# Patient Record
Sex: FEMALE | Race: WHITE | NOT HISPANIC OR LATINO | Employment: FULL TIME | URBAN - METROPOLITAN AREA
[De-identification: names, ages, dates, MRNs, and addresses within clinical notes are randomized per-mention and may not be internally consistent; named-entity substitution may affect disease eponyms.]

---

## 2023-09-15 ENCOUNTER — TRANSCRIBE ORDERS (OUTPATIENT)
Dept: WOMENS IMAGING | Facility: HOSPITAL | Age: 38
End: 2023-09-15
Payer: COMMERCIAL

## 2023-09-15 DIAGNOSIS — Z98.891 HISTORY OF CESAREAN SECTION: ICD-10-CM

## 2023-09-15 DIAGNOSIS — O43.90 ABNORMAL PLACENTA AFFECTING DELIVERY: Primary | ICD-10-CM

## 2023-10-02 ENCOUNTER — OFFICE VISIT (OUTPATIENT)
Dept: OBSTETRICS AND GYNECOLOGY | Facility: HOSPITAL | Age: 38
End: 2023-10-02
Payer: COMMERCIAL

## 2023-10-02 ENCOUNTER — HOSPITAL ENCOUNTER (OUTPATIENT)
Dept: WOMENS IMAGING | Facility: HOSPITAL | Age: 38
Discharge: HOME | End: 2023-10-02
Admitting: OBSTETRICS & GYNECOLOGY
Payer: COMMERCIAL

## 2023-10-02 VITALS
DIASTOLIC BLOOD PRESSURE: 73 MMHG | BODY MASS INDEX: 27.89 KG/M2 | SYSTOLIC BLOOD PRESSURE: 121 MMHG | WEIGHT: 184 LBS | HEIGHT: 68 IN

## 2023-10-02 DIAGNOSIS — Z98.891 HISTORY OF CESAREAN SECTION: ICD-10-CM

## 2023-10-02 DIAGNOSIS — O09.219 PREVIOUS PRETERM DELIVERY, ANTEPARTUM: ICD-10-CM

## 2023-10-02 DIAGNOSIS — Z98.891 PREVIOUS CESAREAN SECTION: ICD-10-CM

## 2023-10-02 DIAGNOSIS — O44.02 PLACENTA PREVIA IN SECOND TRIMESTER: Primary | ICD-10-CM

## 2023-10-02 DIAGNOSIS — O09.522 MULTIGRAVIDA OF ADVANCED MATERNAL AGE IN SECOND TRIMESTER: ICD-10-CM

## 2023-10-02 DIAGNOSIS — O43.90 ABNORMAL PLACENTA AFFECTING DELIVERY: ICD-10-CM

## 2023-10-02 PROCEDURE — 76811 OB US DETAILED SNGL FETUS: CPT

## 2023-10-02 RX ORDER — NICOTINE 21 MG/24HR
1 PATCH, TRANSDERMAL 24 HOURS TRANSDERMAL EVERY 24 HOURS
COMMUNITY

## 2023-10-02 RX ORDER — ASPIRIN 81 MG/1
81 TABLET ORAL DAILY
COMMUNITY

## 2023-10-02 NOTE — ASSESSMENT & PLAN NOTE
History of 2 prior  sections.  Plan for repeat  section.  If placenta previa persists would recommend delivery between 36 and 37 weeks.

## 2023-10-02 NOTE — PROGRESS NOTES
No complaints today , f/u with Gumaro on 10/4/23, nipt- neg per pt (107 pages of pnr, unable to find results)

## 2023-10-02 NOTE — PROGRESS NOTES
"    Maternal/Fetal Medicine Consult Note   Date: 10/02/2023  Name: Kajal Obrien    : 1985     MRN: [id number]     Referring Provider: Cesar Naik MD    Chief Complaint  Placenta previa, previous  section x2, advanced maternal age, history of  birth    Subjective     History of Present Illness:  Kajal Obrien is a 38 y.o.  24w4d who presents today for consultation secondary to placenta previa, previous  section x2, advanced maternal age, history of  birth    DINO: Estimated Date of Delivery: 24     ROS:   Otherwise Noted in HPI    Past Medical History:   Diagnosis Date    History of  delivery      labor and delivery - substance abuse - child with CP- had cord prolapse    History of substance abuse     Clean since - no meds for recovery at this time    Placenta previa antepartum     this pregnancy      Past Surgical History:   Procedure Laterality Date     SECTION       and 2015 x 2    LEEP  2008      OB History          6    Para   4    Term   3       1    AB   1    Living   4         SAB   1    IAB   0    Ectopic   0    Molar   0    Multiple   0    Live Births   4          Obstetric Comments   Fob #1 - Pregnancy #1  Fob #2 - Pregnancy #2- #6 , Wil Ledbetter  Pregnancy #4 - PTL/PTD- 28 week delivery, cord prolapse               Current Outpatient Medications:     aspirin 81 MG EC tablet, Take 1 tablet by mouth Daily., Disp: , Rfl:     nicotine (NICODERM CQ) 14 MG/24HR patch, Place 1 patch on the skin as directed by provider Daily., Disp: , Rfl:     Objective     Vital Signs  /73   Ht 172.7 cm (68\")   Wt 83.5 kg (184 lb)   Estimated body mass index is 27.98 kg/m² as calculated from the following:    Height as of this encounter: 172.7 cm (68\").    Weight as of this encounter: 83.5 kg (184 lb).    Ultrasound Impression:   See Viewpoint     Assessment and Plan     Kajal Obrien is a 38 y.o.  24w4d " who presents today for consultation secondary to placenta previa, previous  section x2, advanced maternal age, history of  birth    Diagnoses and all orders for this visit:    1. Placenta previa in second trimester (Primary)  Assessment & Plan:  Patient with a complete posterior previa noted today on ultrasound.  Patient does have a history of 2 prior  sections though placenta does not appear to be approaching anterior lower uterine segment.  Discussed continued pelvic rest until follow-up ultrasound      2. Previous  section  Assessment & Plan:  History of 2 prior  sections.  Plan for repeat  section.  If placenta previa persists would recommend delivery between 36 and 37 weeks.      3. Multigravida of advanced maternal age in second trimester  Assessment & Plan:  She reports negative NIPT.      4. Previous  delivery, antepartum  Assessment & Plan:  Reports a history of a 28-week  labor and cord prolapse.  Next delivery was at 39 weeks.           Follow Up  Follow-up ultrasound in 4 weeks    I spent 30 minutes caring for the patient on the day of service. This included: obtaining or reviewing a separately obtained medical history, reviewing patient records, performing a medically appropriate exam and/or evaluation, counseling or educating the patient/family/caregiver, ordering medications, labs, and/or procedures and documenting such in the medical record. This does not include time spent on review and interpretation of other tests such as fetal ultrasound or the performance of other procedures such as amniocentesis or CVS.      Horacio Wynn MD, FACOG  Maternal Fetal Medicine, [facility] [place]

## 2023-10-02 NOTE — ASSESSMENT & PLAN NOTE
Patient with a complete posterior previa noted today on ultrasound.  Patient does have a history of 2 prior  sections though placenta does not appear to be approaching anterior lower uterine segment.  Discussed continued pelvic rest until follow-up ultrasound

## 2023-10-02 NOTE — LETTER
2023       No Recipients    Patient: Kajal Obrien   YOB: 1985   Date of Visit: 10/2/2023       Dear Cesar Naik MD,    Thank you for referring Kajal Obrien to me for evaluation. Below is a copy of my consult note.    If you have questions, please do not hesitate to call me. I look forward to following Kajal along with you.         Sincerely,        Horacio Wynn MD        CC:   No Recipients    No complaints today , f/u with Gumaro on 10/4/23, nipt- neg per pt (107 pages of pnr, unable to find results)         Maternal/Fetal Medicine Consult Note   Date: 10/02/2023  Name: Kajal Obrien    : 1985     MRN: [id number]     Referring Provider: Cesar Naik MD    Chief Complaint  Placenta previa, previous  section x2, advanced maternal age, history of  birth    Subjective     History of Present Illness:  Kajal Obrien is a 38 y.o.  24w4d who presents today for consultation secondary to placenta previa, previous  section x2, advanced maternal age, history of  birth    DINO: Estimated Date of Delivery: 24     ROS:   Otherwise Noted in HPI    Past Medical History:   Diagnosis Date   • History of  delivery      labor and delivery - substance abuse - child with CP- had cord prolapse   • History of substance abuse     Clean since - no meds for recovery at this time   • Placenta previa antepartum     this pregnancy      Past Surgical History:   Procedure Laterality Date   •  SECTION       and 2015 x 2   • LEEP  2008      OB History          6    Para   4    Term   3       1    AB   1    Living   4         SAB   1    IAB   0    Ectopic   0    Molar   0    Multiple   0    Live Births   4          Obstetric Comments   Fob #1 - Pregnancy #1  Fob #2 - Pregnancy #2- #6 , Wil Ledbetter  Pregnancy #4 - PTL/PTD- 28 week delivery, cord prolapse               Current Outpatient Medications:   •   "aspirin 81 MG EC tablet, Take 1 tablet by mouth Daily., Disp: , Rfl:   •  nicotine (NICODERM CQ) 14 MG/24HR patch, Place 1 patch on the skin as directed by provider Daily., Disp: , Rfl:     Objective     Vital Signs  /73   Ht 172.7 cm (68\")   Wt 83.5 kg (184 lb)   Estimated body mass index is 27.98 kg/m² as calculated from the following:    Height as of this encounter: 172.7 cm (68\").    Weight as of this encounter: 83.5 kg (184 lb).    Ultrasound Impression:   See Viewpoint     Assessment and Plan     Kajal Obrien is a 38 y.o.  24w4d who presents today for consultation secondary to placenta previa, previous  section x2, advanced maternal age, history of  birth    Diagnoses and all orders for this visit:    1. Placenta previa in second trimester (Primary)  Assessment & Plan:  Patient with a complete posterior previa noted today on ultrasound.  Patient does have a history of 2 prior  sections though placenta does not appear to be approaching anterior lower uterine segment.  Discussed continued pelvic rest until follow-up ultrasound      2. Previous  section  Assessment & Plan:  History of 2 prior  sections.  Plan for repeat  section.  If placenta previa persists would recommend delivery between 36 and 37 weeks.      3. Multigravida of advanced maternal age in second trimester  Assessment & Plan:  She reports negative NIPT.      4. Previous  delivery, antepartum  Assessment & Plan:  Reports a history of a 28-week  labor and cord prolapse.  Next delivery was at 39 weeks.           Follow Up  Follow-up ultrasound in 4 weeks    I spent 30 minutes caring for the patient on the day of service. This included: obtaining or reviewing a separately obtained medical history, reviewing patient records, performing a medically appropriate exam and/or evaluation, counseling or educating the patient/family/caregiver, ordering medications, labs, and/or " procedures and documenting such in the medical record. This does not include time spent on review and interpretation of other tests such as fetal ultrasound or the performance of other procedures such as amniocentesis or CVS.      Horacio Wynn MD, FACOG  Maternal Fetal Medicine, [facility] [place]